# Patient Record
Sex: FEMALE | Race: WHITE | URBAN - METROPOLITAN AREA
[De-identification: names, ages, dates, MRNs, and addresses within clinical notes are randomized per-mention and may not be internally consistent; named-entity substitution may affect disease eponyms.]

---
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
## 2022-01-01 VITALS
BODY MASS INDEX: 32.39 KG/M2 | OXYGEN SATURATION: 97 % | DIASTOLIC BLOOD PRESSURE: 70 MMHG | TEMPERATURE: 98 F | RESPIRATION RATE: 16 BRPM | HEART RATE: 74 BPM | SYSTOLIC BLOOD PRESSURE: 116 MMHG | HEIGHT: 62 IN | WEIGHT: 176 LBS

## 2022-01-01 PROCEDURE — 6370000000 HC RX 637 (ALT 250 FOR IP): Performed by: OBSTETRICS & GYNECOLOGY

## 2022-01-01 RX ADMIN — ACETAMINOPHEN 650 MG: 325 TABLET ORAL at 00:18

## 2022-01-01 RX ADMIN — IBUPROFEN 800 MG: 800 TABLET, FILM COATED ORAL at 04:00

## 2022-01-01 ASSESSMENT — PAIN SCALES - GENERAL
PAINLEVEL_OUTOF10: 4
PAINLEVEL_OUTOF10: 0

## 2022-01-01 NOTE — DISCHARGE SUMMARY
Obstetrical Discharge Form    Gestational Age: 43w4d    Antepartum complications: none    Date of Delivery: 22      Type of Delivery: vaginal, spontaneous    Delivered By: Raya Nobles     Baby:      Information for the patient's :  Eva Bryant [[id number]]   APGAR One: 7     Information for the patient's :  Eva Bryant [[id number]]   APGAR Five: 9     Information for the patient's :  Eva Bryant [[id number]]   Birth Weight: 6 lb 12.5 oz (3.075 kg)       Anesthesia: Epidural    Intrapartum complications: None    Postpartum complications: none    Discharge Medication:      Medication List      START taking these medications    HYDROcodone-acetaminophen 5-325 MG per tablet  Commonly known as: Norco  Take 1 tablet by mouth every 6 hours as needed for Pain for up to 7 days. ibuprofen 600 MG tablet  Commonly known as: ADVIL;MOTRIN  Take 1 tablet by mouth every 6 hours as needed for Pain        ASK your doctor about these medications    PRENATAL 1 PO           Where to Get Your Medications      You can get these medications from any pharmacy    Bring a paper prescription for each of these medications  · HYDROcodone-acetaminophen 5-325 MG per tablet  · ibuprofen 600 MG tablet         Discharge Condition:  good    Discharge Date: 2022    PLAN:  Follow up in 6 weeks for routine PP visit  All questions answered  D/C summary begun at delivery for D/C planning purposes, any delay in discharge from ordered D/C date due to  factors.

## 2022-01-01 NOTE — PROGRESS NOTES
[facility] post-partum note    Post-partum Day #2    Subjective:    Patient doing well. Breastfeeding.   Lochia: Normal    Objective:  Vitals:    12/31/21 1008 12/31/21 1740 01/01/22 0100 01/01/22 0900   BP: 112/68 120/78 113/71 116/70   Pulse: 86 90 82 74   Resp: 16 17 16 16   Temp: 98.2 °F (36.8 °C) 98.2 °F (36.8 °C) 97.7 °F (36.5 °C) 98 °F (36.7 °C)   TempSrc: Oral Oral Oral Oral   SpO2:       Weight:       Height:         BP: 112/68 P-86 R-16 T- 98.2    Physical Examination:  Appears well  Lings: CTA-B  Heart- RRR  Uterus: Firm, NT  Calves: NT    Labs:    Recent Labs     12/29/21  1830   WBC 11.7*   HGB 10.5*   HCT 32.4*        Recent Labs     12/29/21  1830      K 4.3      CO2 18*   BUN 10   CREATININE 0.8   CALCIUM 9.5   AST 23   ALT 16         Current Facility-Administered Medications:     sodium chloride flush 0.9 % injection 5-40 mL, 5-40 mL, IntraVENous, 2 times per day, Evangelina Zelaya MD, 10 mL at 12/31/21 4072    sodium chloride flush 0.9 % injection 5-40 mL, 5-40 mL, IntraVENous, PRN, Lit Funez MD    0.9 % sodium chloride infusion, 25 mL, IntraVENous, PRN, Evangelina Zelaya MD    acetaminophen (TYLENOL) tablet 650 mg, 650 mg, Oral, Q4H PRN, Evangelina Zelaya MD, 650 mg at 01/01/22 0018    ibuprofen (ADVIL;MOTRIN) tablet 800 mg, 800 mg, Oral, Q8H, Le Funez MD, 800 mg at 01/01/22 0400    docusate sodium (COLACE) capsule 100 mg, 100 mg, Oral, BID, Iesha Funez MD, 100 mg at 12/31/21 2004    lansinoh lanolin ointment, , Topical, PRN, Lit Funez MD    benzocaine-menthol (DERMOPLAST) 20-0.5 % spray, , Topical, PRN, Evangelina Zelaya MD, Given at 12/30/21 0452    Tetanus-Diphth-Acell Pertussis (BOOSTRIX) injection 0.5 mL, 0.5 mL, IntraMUSCular, Prior to discharge, Evangelina Zelaya MD    HYDROcodone-acetaminophen (NORCO) 5-325 MG per tablet 1 tablet, 1 tablet, Oral, Q4H PRN **OR** HYDROcodone-acetaminophen (NORCO) 5-325 MG per tablet 2 tablet, 2 tablet, Oral, Q4H PRN, Cami Sánchez MD     Assessment/Plan:    26 y/o  s/p  doing well.  PPD2  Post Partum: advance Postpartum care  Discharge today yes  F/U in 6 wks

## 2023-12-27 ENCOUNTER — OFFICE VISIT (OUTPATIENT)
Age: 23
End: 2023-12-27

## 2023-12-27 VITALS
WEIGHT: 160.6 LBS | HEART RATE: 83 BPM | HEIGHT: 62 IN | OXYGEN SATURATION: 99 % | DIASTOLIC BLOOD PRESSURE: 82 MMHG | SYSTOLIC BLOOD PRESSURE: 137 MMHG | BODY MASS INDEX: 29.55 KG/M2 | TEMPERATURE: 98.3 F

## 2023-12-27 DIAGNOSIS — J40 BRONCHITIS: Primary | ICD-10-CM

## 2023-12-27 RX ORDER — AZITHROMYCIN 250 MG/1
250 TABLET, FILM COATED ORAL SEE ADMIN INSTRUCTIONS
Qty: 6 TABLET | Refills: 0 | Status: SHIPPED | OUTPATIENT
Start: 2023-12-27 | End: 2024-01-01

## 2023-12-27 RX ORDER — BENZONATATE 100 MG/1
100 CAPSULE ORAL 3 TIMES DAILY PRN
Qty: 30 CAPSULE | Refills: 0 | Status: SHIPPED | OUTPATIENT
Start: 2023-12-27 | End: 2024-01-06